# Patient Record
Sex: FEMALE | Race: WHITE | NOT HISPANIC OR LATINO | Employment: FULL TIME | URBAN - METROPOLITAN AREA
[De-identification: names, ages, dates, MRNs, and addresses within clinical notes are randomized per-mention and may not be internally consistent; named-entity substitution may affect disease eponyms.]

---

## 2017-03-29 ENCOUNTER — PATIENT MESSAGE (OUTPATIENT)
Dept: TRANSPLANT | Facility: CLINIC | Age: 62
End: 2017-03-29

## 2022-08-15 ENCOUNTER — TELEPHONE (OUTPATIENT)
Dept: ORTHOPEDICS | Facility: CLINIC | Age: 67
End: 2022-08-15
Payer: COMMERCIAL

## 2022-08-15 NOTE — TELEPHONE ENCOUNTER
Patients daughter called to inform Dr. Fox that she went to the ER due to severe pain in her knee.  She was sent home and has since been running fever 101, her knee is red and severe pain.  She is nauseated and not eating.  I spoke to Dr. Fox and her advised her to go to the ER at [facility] because she is scheduled to revise her knee with antibiotic spacer.  Daughter called back to inform me that they are on their way to the hospital ER.